# Patient Record
Sex: FEMALE | URBAN - METROPOLITAN AREA
[De-identification: names, ages, dates, MRNs, and addresses within clinical notes are randomized per-mention and may not be internally consistent; named-entity substitution may affect disease eponyms.]

---

## 2017-07-06 ENCOUNTER — HOSPITAL ENCOUNTER (OUTPATIENT)
Dept: LAB | Age: 52
Discharge: HOME | End: 2017-07-06
Payer: COMMERCIAL

## 2017-07-06 PROCEDURE — 88305 TISSUE EXAM BY PATHOLOGIST: CPT | Performed by: OBSTETRICS & GYNECOLOGY

## 2018-05-03 ENCOUNTER — HOSPITAL ENCOUNTER (OUTPATIENT)
Dept: LAB | Age: 53
Discharge: HOME | End: 2018-05-03
Payer: COMMERCIAL

## 2018-05-03 PROCEDURE — 88175 CYTOPATH C/V AUTO FLUID REDO: CPT | Performed by: OBSTETRICS & GYNECOLOGY

## 2018-11-05 ENCOUNTER — HOSPITAL ENCOUNTER (OUTPATIENT)
Dept: LAB | Age: 53
Discharge: HOME | End: 2018-11-05
Payer: COMMERCIAL

## 2018-11-05 PROCEDURE — 87624 HPV HI-RISK TYP POOLED RSLT: CPT | Performed by: OBSTETRICS & GYNECOLOGY

## 2018-11-05 PROCEDURE — 88175 CYTOPATH C/V AUTO FLUID REDO: CPT | Performed by: OBSTETRICS & GYNECOLOGY

## 2019-05-17 ENCOUNTER — HOSPITAL ENCOUNTER (OUTPATIENT)
Dept: LAB | Age: 54
Discharge: HOME | End: 2019-05-17
Payer: COMMERCIAL

## 2019-05-17 PROCEDURE — 88175 CYTOPATH C/V AUTO FLUID REDO: CPT

## 2019-05-17 PROCEDURE — 87624 HPV HI-RISK TYP POOLED RSLT: CPT

## 2019-11-18 ENCOUNTER — HOSPITAL ENCOUNTER (OUTPATIENT)
Dept: LAB | Age: 54
Discharge: HOME | End: 2019-11-18
Payer: COMMERCIAL

## 2019-11-18 PROCEDURE — 88175 CYTOPATH C/V AUTO FLUID REDO: CPT

## 2021-05-27 ENCOUNTER — HOSPITAL ENCOUNTER (OUTPATIENT)
Dept: LAB | Age: 56
Discharge: HOME | End: 2021-05-27
Payer: COMMERCIAL

## 2021-05-27 PROCEDURE — 88175 CYTOPATH C/V AUTO FLUID REDO: CPT

## 2021-06-08 LAB
CYTOLOGIST CVX/VAG CYTO: NORMAL
CYTOLOGY CVX/VAG DOC THIN PREP: NORMAL
HPV REFLEX NOTE, HPVL19: NORMAL
Lab: NORMAL
Lab: NORMAL
PATH REPORT.FINAL DX SPEC: NORMAL
STAT OF ADQ CVX/VAG CYTO-IMP: NORMAL

## 2022-10-14 ENCOUNTER — HOSPITAL ENCOUNTER (OUTPATIENT)
Dept: LAB | Age: 57
Discharge: HOME | End: 2022-10-14
Payer: COMMERCIAL

## 2022-10-14 PROCEDURE — 87624 HPV HI-RISK TYP POOLED RSLT: CPT

## 2022-10-14 PROCEDURE — 88175 CYTOPATH C/V AUTO FLUID REDO: CPT

## 2025-03-31 ENCOUNTER — OFFICE VISIT (OUTPATIENT)
Age: 60
End: 2025-03-31
Payer: COMMERCIAL

## 2025-03-31 VITALS
WEIGHT: 174.6 LBS | SYSTOLIC BLOOD PRESSURE: 114 MMHG | BODY MASS INDEX: 28.06 KG/M2 | OXYGEN SATURATION: 95 % | HEART RATE: 61 BPM | TEMPERATURE: 96.9 F | HEIGHT: 66 IN | DIASTOLIC BLOOD PRESSURE: 76 MMHG

## 2025-03-31 DIAGNOSIS — R55 SYNCOPE AND COLLAPSE: ICD-10-CM

## 2025-03-31 DIAGNOSIS — R94.31 ABNORMAL ECG: ICD-10-CM

## 2025-03-31 DIAGNOSIS — I25.118 CORONARY ARTERY DISEASE INVOLVING NATIVE CORONARY ARTERY OF NATIVE HEART WITH OTHER FORM OF ANGINA PECTORIS: Primary | ICD-10-CM

## 2025-03-31 DIAGNOSIS — E78.00 HYPERCHOLESTEREMIA: ICD-10-CM

## 2025-03-31 DIAGNOSIS — R01.1 SYSTOLIC MURMUR: ICD-10-CM

## 2025-03-31 DIAGNOSIS — Z82.49 FAMILY HISTORY OF ISCHEMIC HEART DISEASE (IHD): ICD-10-CM

## 2025-03-31 PROCEDURE — 99205 OFFICE O/P NEW HI 60 MIN: CPT | Performed by: INTERNAL MEDICINE

## 2025-03-31 PROCEDURE — 93000 ELECTROCARDIOGRAM COMPLETE: CPT | Performed by: INTERNAL MEDICINE

## 2025-03-31 ASSESSMENT — PATIENT HEALTH QUESTIONNAIRE - PHQ9
SUM OF ALL RESPONSES TO PHQ QUESTIONS 1-9: 0
2. FEELING DOWN, DEPRESSED OR HOPELESS: NOT AT ALL
SUM OF ALL RESPONSES TO PHQ QUESTIONS 1-9: 0
1. LITTLE INTEREST OR PLEASURE IN DOING THINGS: NOT AT ALL

## 2025-03-31 ASSESSMENT — ENCOUNTER SYMPTOMS
EYES NEGATIVE: 1
ALLERGIC/IMMUNOLOGIC NEGATIVE: 1
SHORTNESS OF BREATH: 1
GASTROINTESTINAL NEGATIVE: 1

## 2025-03-31 NOTE — PROGRESS NOTES
1. \"Have you been to the ER, urgent care clinic since your last visit?  Hospitalized since your last visit?\" Reviewed by Dr. Tyree Bacon  2. \"Have you seen or consulted any other health care providers outside of the [facility] since your last visit?\" Reviewed by Dr. Tyree Bacon

## 2025-03-31 NOTE — PROGRESS NOTES
Dominique Castillo (:  1965) is a 59 y.o. female,New patient, here for evaluation of the following chief complaint(s):  New Patient (Referred by Desire Lance MD-chest pain)      Subjective   SUBJECTIVE/OBJECTIVE:    History of Present Illness  The patient is a 59-year-old female who presents for syncope, prediabetes, hypercholesterolemia, and chest discomfort.    She has a history of syncope, which she attributes to a vagal issue. She was diagnosed with a heart murmur at the age of 10. She experiences blackouts approximately 4 times a year, often preceded by visual disturbances and profuse sweating. These episodes are also accompanied by nausea and vomiting. Adequate hydration is maintained.    Lethargy and fatigue are reported, which she attributes to her weight gain of approximately 25 to 30 pounds. Physical activity is limited, although brief walks with her dogs are done a few times daily for about 15 minutes. She works as an , a role that requires her to be seated for 10 hours daily.    Intermittent chest discomfort has been experienced over the past 2 to 3 months. Abnormal EKGs were noted recently, and she was advised to have a stress test, but her insurance company denied it and required her to see a cardiologist first. The last EKG was conducted in the . A family history of epilepsy on her maternal grandmother's side and heart issues in both parents is noted, which she attributes to smoking. She smoked as a teenager for about 5 years and lived in a household with 2 smoking parents for 20 years.    Prediabetes and cholesterol issues are present, for which she is on medication.    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

## 2025-05-14 ENCOUNTER — TELEPHONE (OUTPATIENT)
Age: 60
End: 2025-05-14

## 2025-05-21 ENCOUNTER — PATIENT MESSAGE (OUTPATIENT)
Age: 60
End: 2025-05-21

## 2025-05-22 NOTE — TELEPHONE ENCOUNTER
Patient called and is anxious about her results. She was identified by full name and .  I did review the Echo, as it was on the portal stating it was abnormal.  Explained to her that the test was negative with minor wall thickening, which is usually attributed to BP. Encouraged her to make sure that her  BP stays within normal range.    ST not officially read, but she did meet her target HR and recovery time was good.    She would like the official reading or at least the providers reading of the two test placed on the portal for her to read.   
impaired sensory feedback

## 2025-05-30 ENCOUNTER — TELEPHONE (OUTPATIENT)
Age: 60
End: 2025-05-30

## 2025-05-30 NOTE — TELEPHONE ENCOUNTER
Called Ms Morris as requested    I went over with her her echo results showing no significant abnormalities.  She has a chronically abnormal EKG and I went over this with her as well.  Her stress test was negative.  I answered all questions.  She can keep her next appointment as currently scheduled but if something changes, she can always call us and let us know and we can get her in sooner.    Corrie Rodrigues PA-C  4:58 PM